# Patient Record
Sex: FEMALE | Race: ASIAN | NOT HISPANIC OR LATINO | Employment: PART TIME | URBAN - METROPOLITAN AREA
[De-identification: names, ages, dates, MRNs, and addresses within clinical notes are randomized per-mention and may not be internally consistent; named-entity substitution may affect disease eponyms.]

---

## 2022-01-07 ENCOUNTER — TRANSFERRED RECORDS (OUTPATIENT)
Dept: HEALTH INFORMATION MANAGEMENT | Facility: CLINIC | Age: 22
End: 2022-01-07

## 2022-03-13 LAB
ABO (EXTERNAL): NORMAL
HEMOGLOBIN (EXTERNAL): 13.4 G/DL (ref 11.7–15.5)
HEPATITIS B SURFACE ANTIGEN (EXTERNAL): NONREACTIVE
HIV1+2 AB SERPL QL IA: NONREACTIVE
PLATELET COUNT (EXTERNAL): 226 10E3/UL (ref 140–400)
RH (EXTERNAL): POSITIVE
RUBELLA ANTIBODY IGG (EXTERNAL): NORMAL
TREPONEMA PALLIDUM ANTIBODY (EXTERNAL): NONREACTIVE

## 2022-09-14 ENCOUNTER — TRANSFERRED RECORDS (OUTPATIENT)
Dept: HEALTH INFORMATION MANAGEMENT | Facility: CLINIC | Age: 22
End: 2022-09-14

## 2022-09-17 LAB — GROUP B STREPTOCOCCUS (EXTERNAL): NEGATIVE

## 2022-10-04 ENCOUNTER — HOSPITAL ENCOUNTER (INPATIENT)
Facility: HOSPITAL | Age: 22
LOS: 2 days | Discharge: HOME | End: 2022-10-06
Attending: ADVANCED PRACTICE MIDWIFE | Admitting: ADVANCED PRACTICE MIDWIFE
Payer: COMMERCIAL

## 2022-10-04 LAB
ABO/RH(D): NORMAL
ANTIBODY SCREEN: NEGATIVE
HGB BLD-MCNC: 12.5 G/DL (ref 11.7–15.7)
SARS-COV-2 RNA RESP QL NAA+PROBE: POSITIVE
SPECIMEN EXPIRATION DATE: NORMAL

## 2022-10-04 PROCEDURE — 86901 BLOOD TYPING SEROLOGIC RH(D): CPT | Performed by: ADVANCED PRACTICE MIDWIFE

## 2022-10-04 PROCEDURE — 250N000011 HC RX IP 250 OP 636: Performed by: ADVANCED PRACTICE MIDWIFE

## 2022-10-04 PROCEDURE — 86850 RBC ANTIBODY SCREEN: CPT | Performed by: ADVANCED PRACTICE MIDWIFE

## 2022-10-04 PROCEDURE — U0005 INFEC AGEN DETEC AMPLI PROBE: HCPCS | Performed by: ADVANCED PRACTICE MIDWIFE

## 2022-10-04 PROCEDURE — 85018 HEMOGLOBIN: CPT | Performed by: ADVANCED PRACTICE MIDWIFE

## 2022-10-04 PROCEDURE — 120N000001 HC R&B MED SURG/OB

## 2022-10-04 PROCEDURE — 36415 COLL VENOUS BLD VENIPUNCTURE: CPT | Performed by: ADVANCED PRACTICE MIDWIFE

## 2022-10-04 RX ORDER — SODIUM CHLORIDE, SODIUM LACTATE, POTASSIUM CHLORIDE, CALCIUM CHLORIDE 600; 310; 30; 20 MG/100ML; MG/100ML; MG/100ML; MG/100ML
INJECTION, SOLUTION INTRAVENOUS CONTINUOUS
Status: DISCONTINUED | OUTPATIENT
Start: 2022-10-04 | End: 2022-10-05 | Stop reason: HOSPADM

## 2022-10-04 RX ORDER — LIDOCAINE 40 MG/G
CREAM TOPICAL
Status: DISCONTINUED | OUTPATIENT
Start: 2022-10-04 | End: 2022-10-05 | Stop reason: HOSPADM

## 2022-10-04 RX ORDER — OXYTOCIN 10 [USP'U]/ML
10 INJECTION, SOLUTION INTRAMUSCULAR; INTRAVENOUS
Status: DISCONTINUED | OUTPATIENT
Start: 2022-10-04 | End: 2022-10-06 | Stop reason: HOSPADM

## 2022-10-04 RX ORDER — KETOROLAC TROMETHAMINE 30 MG/ML
30 INJECTION, SOLUTION INTRAMUSCULAR; INTRAVENOUS
Status: DISCONTINUED | OUTPATIENT
Start: 2022-10-04 | End: 2022-10-06 | Stop reason: HOSPADM

## 2022-10-04 RX ORDER — NALOXONE HYDROCHLORIDE 0.4 MG/ML
0.2 INJECTION, SOLUTION INTRAMUSCULAR; INTRAVENOUS; SUBCUTANEOUS
Status: DISCONTINUED | OUTPATIENT
Start: 2022-10-04 | End: 2022-10-05 | Stop reason: HOSPADM

## 2022-10-04 RX ORDER — CITRIC ACID/SODIUM CITRATE 334-500MG
30 SOLUTION, ORAL ORAL
Status: DISCONTINUED | OUTPATIENT
Start: 2022-10-04 | End: 2022-10-05 | Stop reason: HOSPADM

## 2022-10-04 RX ORDER — CALCIUM CARBONATE 500 MG/1
1 TABLET, CHEWABLE ORAL 2 TIMES DAILY
COMMUNITY

## 2022-10-04 RX ORDER — ONDANSETRON 4 MG/1
4 TABLET, ORALLY DISINTEGRATING ORAL EVERY 6 HOURS PRN
Status: DISCONTINUED | OUTPATIENT
Start: 2022-10-04 | End: 2022-10-05 | Stop reason: HOSPADM

## 2022-10-04 RX ORDER — METOCLOPRAMIDE 10 MG/1
10 TABLET ORAL EVERY 6 HOURS PRN
Status: DISCONTINUED | OUTPATIENT
Start: 2022-10-04 | End: 2022-10-05 | Stop reason: HOSPADM

## 2022-10-04 RX ORDER — FENTANYL CITRATE 50 UG/ML
100 INJECTION, SOLUTION INTRAMUSCULAR; INTRAVENOUS
Status: DISCONTINUED | OUTPATIENT
Start: 2022-10-04 | End: 2022-10-05 | Stop reason: HOSPADM

## 2022-10-04 RX ORDER — ONDANSETRON 2 MG/ML
4 INJECTION INTRAMUSCULAR; INTRAVENOUS EVERY 6 HOURS PRN
Status: DISCONTINUED | OUTPATIENT
Start: 2022-10-04 | End: 2022-10-05 | Stop reason: HOSPADM

## 2022-10-04 RX ORDER — PROCHLORPERAZINE 25 MG
25 SUPPOSITORY, RECTAL RECTAL EVERY 12 HOURS PRN
Status: DISCONTINUED | OUTPATIENT
Start: 2022-10-04 | End: 2022-10-05 | Stop reason: HOSPADM

## 2022-10-04 RX ORDER — OXYTOCIN/0.9 % SODIUM CHLORIDE 30/500 ML
340 PLASTIC BAG, INJECTION (ML) INTRAVENOUS CONTINUOUS PRN
Status: DISCONTINUED | OUTPATIENT
Start: 2022-10-04 | End: 2022-10-05 | Stop reason: HOSPADM

## 2022-10-04 RX ORDER — PROCHLORPERAZINE MALEATE 10 MG
10 TABLET ORAL EVERY 6 HOURS PRN
Status: DISCONTINUED | OUTPATIENT
Start: 2022-10-04 | End: 2022-10-05 | Stop reason: HOSPADM

## 2022-10-04 RX ORDER — IBUPROFEN 800 MG/1
800 TABLET, FILM COATED ORAL
Status: DISCONTINUED | OUTPATIENT
Start: 2022-10-04 | End: 2022-10-06 | Stop reason: HOSPADM

## 2022-10-04 RX ORDER — MISOPROSTOL 200 UG/1
400 TABLET ORAL
Status: DISCONTINUED | OUTPATIENT
Start: 2022-10-04 | End: 2022-10-05 | Stop reason: HOSPADM

## 2022-10-04 RX ORDER — METHYLERGONOVINE MALEATE 0.2 MG/ML
200 INJECTION INTRAVENOUS
Status: DISCONTINUED | OUTPATIENT
Start: 2022-10-04 | End: 2022-10-05 | Stop reason: HOSPADM

## 2022-10-04 RX ORDER — CARBOPROST TROMETHAMINE 250 UG/ML
250 INJECTION, SOLUTION INTRAMUSCULAR
Status: DISCONTINUED | OUTPATIENT
Start: 2022-10-04 | End: 2022-10-05 | Stop reason: HOSPADM

## 2022-10-04 RX ORDER — OXYTOCIN/0.9 % SODIUM CHLORIDE 30/500 ML
100-340 PLASTIC BAG, INJECTION (ML) INTRAVENOUS CONTINUOUS PRN
Status: DISCONTINUED | OUTPATIENT
Start: 2022-10-04 | End: 2022-10-06 | Stop reason: HOSPADM

## 2022-10-04 RX ORDER — MISOPROSTOL 200 UG/1
800 TABLET ORAL
Status: DISCONTINUED | OUTPATIENT
Start: 2022-10-04 | End: 2022-10-05 | Stop reason: HOSPADM

## 2022-10-04 RX ORDER — METOCLOPRAMIDE HYDROCHLORIDE 5 MG/ML
10 INJECTION INTRAMUSCULAR; INTRAVENOUS EVERY 6 HOURS PRN
Status: DISCONTINUED | OUTPATIENT
Start: 2022-10-04 | End: 2022-10-05 | Stop reason: HOSPADM

## 2022-10-04 RX ORDER — ACETAMINOPHEN 500 MG
500-1000 TABLET ORAL EVERY 6 HOURS PRN
COMMUNITY

## 2022-10-04 RX ORDER — OXYTOCIN 10 [USP'U]/ML
10 INJECTION, SOLUTION INTRAMUSCULAR; INTRAVENOUS
Status: DISCONTINUED | OUTPATIENT
Start: 2022-10-04 | End: 2022-10-05 | Stop reason: HOSPADM

## 2022-10-04 RX ORDER — NALOXONE HYDROCHLORIDE 0.4 MG/ML
0.4 INJECTION, SOLUTION INTRAMUSCULAR; INTRAVENOUS; SUBCUTANEOUS
Status: DISCONTINUED | OUTPATIENT
Start: 2022-10-04 | End: 2022-10-05 | Stop reason: HOSPADM

## 2022-10-04 RX ORDER — LIDOCAINE 40 MG/G
CREAM TOPICAL
Status: DISCONTINUED | OUTPATIENT
Start: 2022-10-04 | End: 2022-10-04 | Stop reason: HOSPADM

## 2022-10-04 RX ADMIN — FENTANYL CITRATE 100 MCG: 50 INJECTION, SOLUTION INTRAMUSCULAR; INTRAVENOUS at 23:13

## 2022-10-04 ASSESSMENT — ACTIVITIES OF DAILY LIVING (ADL)
DIFFICULTY_COMMUNICATING: NO
CHANGE_IN_FUNCTIONAL_STATUS_SINCE_ONSET_OF_CURRENT_ILLNESS/INJURY: NO
ADLS_ACUITY_SCORE: 18
WALKING_OR_CLIMBING_STAIRS_DIFFICULTY: NO
ADLS_ACUITY_SCORE: 18
CONCENTRATING,_REMEMBERING_OR_MAKING_DECISIONS_DIFFICULTY: NO
ADLS_ACUITY_SCORE: 18
DOING_ERRANDS_INDEPENDENTLY_DIFFICULTY: NO
HEARING_DIFFICULTY_OR_DEAF: NO
ADLS_ACUITY_SCORE: 18
DIFFICULTY_EATING/SWALLOWING: NO
TOILETING_ISSUES: NO
DRESSING/BATHING_DIFFICULTY: NO
WEAR_GLASSES_OR_BLIND: NO
FALL_HISTORY_WITHIN_LAST_SIX_MONTHS: NO

## 2022-10-04 NOTE — H&P
"HISTORY AND PHYSICAL UPDATE ADMISSION EXAM    Name: Laura Her  YOB: 2000  Medical Record Number: [id number]    History of Present Illness: Laura Her is a 22 year old female who is 38w4d pregnant and being admitted for active labor management and SROM. Prodromal labor since Friday. Contractions intensified today. SROM when getting out of car arriving to hospital.     Last growth ultrasound at 35.5w 44.4%    Estimated Date of Delivery: Oct 14, 2022    EGA 38w4d    OB History    Para Term  AB Living   1 0 0 0 0 0   SAB IAB Ectopic Multiple Live Births   0 0 0 0 0      # Outcome Date GA Lbr Lenny/2nd Weight Sex Delivery Anes PTL Lv   1 Current                 No results found for: ABO, RH, AS, HEPBANG, CHPCRT, GCPCRT, TREPAB, RUBELLAABIGG, HGB, HIV    Prenatal Complications:  1. Varicella non-immune  2. Prepregnancy BMI 32, current BMI 37  3. R simple ovarian cyst 3cm  4. Hx infertility conceived on letrozole and timed intercourse cycle      Exam:      /69   Temp 98.4  F (36.9  C) (Oral)   Ht 1.549 m (5' 1\")   Wt 91.2 kg (201 lb)   Breastfeeding No   BMI 37.98 kg/m      Fetal heart Rate Category 1  Contractions Q2-4    HEENT grossly normal  Neck: appears normal  Lungs unlabored breathing  ABD gravid, non-tender  EXT:  no edema, moves freely  Vaginal exam 5/80/-1 per RN  Membranes: SROM    Assessment: active labor management and SROM    Plan:   Admit - see IP orders  Expectant labor management   Pain medication if requested  Anticipate     Prenatal record reviewed.    Dr. Her aware of patient status and remains available for consultation and collaboration as needed.      ERBEKAH Oretga CNM      10/4/2022   4:17 PM  "

## 2022-10-04 NOTE — PROGRESS NOTES
Data: [facility] presented to [facility]: 10/4/2022  3:07 PM.  Patient admitted for labor, ROM upon arrival in parking lot. Patient is a .  Prenatal record reviewed. Pregnancy has been uncomplicated..  Gestational Age 38w4d. VSS. Fetal movement present. Patient denies vaginal bleeding, pelvic pressure. Support person is present.   Action: Verbal consent for EFM and SVE.  Admission assessment completed. Bill of rights reviewed. SVE performed confirming gross ROM, 5 cm dilation.  Covid swab collection  Response: Patient verbalized agreement with plan. Contacted Chyna Hernandes CNM with update and for orders. CNM came to unit for assessment.  Additionally, Covid test for this patient has now resulted positive.  This RN updated patient to plan of care and visitor policy for asymptomatic Covid diagnosis.    Bettye Jade RN  10/4/2022 5:54 PM

## 2022-10-05 LAB
HOLD SPECIMEN: NORMAL
T PALLIDUM AB SER QL: NONREACTIVE

## 2022-10-05 PROCEDURE — 250N000011 HC RX IP 250 OP 636: Performed by: ADVANCED PRACTICE MIDWIFE

## 2022-10-05 PROCEDURE — 999N000016 HC STATISTIC ATTENDANCE AT DELIVERY

## 2022-10-05 PROCEDURE — 258N000003 HC RX IP 258 OP 636: Performed by: ADVANCED PRACTICE MIDWIFE

## 2022-10-05 PROCEDURE — 250N000009 HC RX 250: Performed by: ADVANCED PRACTICE MIDWIFE

## 2022-10-05 PROCEDURE — 999N000157 HC STATISTIC RCP TIME EA 10 MIN

## 2022-10-05 PROCEDURE — 10907ZC DRAINAGE OF AMNIOTIC FLUID, THERAPEUTIC FROM PRODUCTS OF CONCEPTION, VIA NATURAL OR ARTIFICIAL OPENING: ICD-10-PCS | Performed by: ADVANCED PRACTICE MIDWIFE

## 2022-10-05 PROCEDURE — 722N000001 HC LABOR CARE VAGINAL DELIVERY SINGLE

## 2022-10-05 PROCEDURE — 36415 COLL VENOUS BLD VENIPUNCTURE: CPT | Performed by: ADVANCED PRACTICE MIDWIFE

## 2022-10-05 PROCEDURE — 120N000001 HC R&B MED SURG/OB

## 2022-10-05 PROCEDURE — 86780 TREPONEMA PALLIDUM: CPT | Performed by: ADVANCED PRACTICE MIDWIFE

## 2022-10-05 PROCEDURE — 0KQM0ZZ REPAIR PERINEUM MUSCLE, OPEN APPROACH: ICD-10-PCS | Performed by: ADVANCED PRACTICE MIDWIFE

## 2022-10-05 PROCEDURE — 250N000013 HC RX MED GY IP 250 OP 250 PS 637: Performed by: ADVANCED PRACTICE MIDWIFE

## 2022-10-05 RX ORDER — ACETAMINOPHEN 325 MG/1
650 TABLET ORAL EVERY 4 HOURS PRN
Status: DISCONTINUED | OUTPATIENT
Start: 2022-10-05 | End: 2022-10-06 | Stop reason: HOSPADM

## 2022-10-05 RX ORDER — MISOPROSTOL 200 UG/1
800 TABLET ORAL
Status: DISCONTINUED | OUTPATIENT
Start: 2022-10-05 | End: 2022-10-06 | Stop reason: HOSPADM

## 2022-10-05 RX ORDER — OXYTOCIN 10 [USP'U]/ML
10 INJECTION, SOLUTION INTRAMUSCULAR; INTRAVENOUS
Status: DISCONTINUED | OUTPATIENT
Start: 2022-10-05 | End: 2022-10-06 | Stop reason: HOSPADM

## 2022-10-05 RX ORDER — DOCUSATE SODIUM 100 MG/1
100 CAPSULE, LIQUID FILLED ORAL DAILY
Status: DISCONTINUED | OUTPATIENT
Start: 2022-10-05 | End: 2022-10-06 | Stop reason: HOSPADM

## 2022-10-05 RX ORDER — MODIFIED LANOLIN
OINTMENT (GRAM) TOPICAL
Status: DISCONTINUED | OUTPATIENT
Start: 2022-10-05 | End: 2022-10-06 | Stop reason: HOSPADM

## 2022-10-05 RX ORDER — BISACODYL 10 MG
10 SUPPOSITORY, RECTAL RECTAL DAILY PRN
Status: DISCONTINUED | OUTPATIENT
Start: 2022-10-05 | End: 2022-10-06 | Stop reason: HOSPADM

## 2022-10-05 RX ORDER — OXYTOCIN/0.9 % SODIUM CHLORIDE 30/500 ML
340 PLASTIC BAG, INJECTION (ML) INTRAVENOUS CONTINUOUS PRN
Status: DISCONTINUED | OUTPATIENT
Start: 2022-10-05 | End: 2022-10-06 | Stop reason: HOSPADM

## 2022-10-05 RX ORDER — IBUPROFEN 800 MG/1
800 TABLET, FILM COATED ORAL EVERY 6 HOURS PRN
Status: DISCONTINUED | OUTPATIENT
Start: 2022-10-05 | End: 2022-10-06 | Stop reason: HOSPADM

## 2022-10-05 RX ORDER — MISOPROSTOL 200 UG/1
400 TABLET ORAL
Status: DISCONTINUED | OUTPATIENT
Start: 2022-10-05 | End: 2022-10-06 | Stop reason: HOSPADM

## 2022-10-05 RX ORDER — CARBOPROST TROMETHAMINE 250 UG/ML
250 INJECTION, SOLUTION INTRAMUSCULAR
Status: DISCONTINUED | OUTPATIENT
Start: 2022-10-05 | End: 2022-10-06 | Stop reason: HOSPADM

## 2022-10-05 RX ORDER — HYDROCORTISONE 25 MG/G
CREAM TOPICAL 3 TIMES DAILY PRN
Status: DISCONTINUED | OUTPATIENT
Start: 2022-10-05 | End: 2022-10-06 | Stop reason: HOSPADM

## 2022-10-05 RX ORDER — METHYLERGONOVINE MALEATE 0.2 MG/ML
200 INJECTION INTRAVENOUS
Status: DISCONTINUED | OUTPATIENT
Start: 2022-10-05 | End: 2022-10-06 | Stop reason: HOSPADM

## 2022-10-05 RX ADMIN — SODIUM CHLORIDE, POTASSIUM CHLORIDE, SODIUM LACTATE AND CALCIUM CHLORIDE 500 ML: 600; 310; 30; 20 INJECTION, SOLUTION INTRAVENOUS at 01:18

## 2022-10-05 RX ADMIN — ACETAMINOPHEN 650 MG: 325 TABLET, FILM COATED ORAL at 03:43

## 2022-10-05 RX ADMIN — IBUPROFEN 800 MG: 800 TABLET ORAL at 19:05

## 2022-10-05 RX ADMIN — ACETAMINOPHEN 650 MG: 325 TABLET, FILM COATED ORAL at 08:06

## 2022-10-05 RX ADMIN — LIDOCAINE HYDROCHLORIDE 20 ML: 10 INJECTION, SOLUTION EPIDURAL; INFILTRATION; INTRACAUDAL; PERINEURAL at 01:51

## 2022-10-05 RX ADMIN — DOCUSATE SODIUM 100 MG: 100 CAPSULE, LIQUID FILLED ORAL at 08:06

## 2022-10-05 RX ADMIN — KETOROLAC TROMETHAMINE 30 MG: 30 INJECTION, SOLUTION INTRAMUSCULAR at 03:41

## 2022-10-05 RX ADMIN — FENTANYL CITRATE 100 MCG: 50 INJECTION, SOLUTION INTRAMUSCULAR; INTRAVENOUS at 00:17

## 2022-10-05 ASSESSMENT — ACTIVITIES OF DAILY LIVING (ADL)
ADLS_ACUITY_SCORE: 18

## 2022-10-05 NOTE — PROGRESS NOTES
Hlee is reporting increase in rectal pressure and pain with contractions. Still coping with nonpharmacologic measures. Consented to continuous EFM; monitors in place. SVE per flowsheet. Contacted midwife with update; states she will go to bedside for labor support.

## 2022-10-05 NOTE — PROGRESS NOTES
"Patient Name:  Laura Her  :      2000  MRN:      [id number]    Subjective:  Laura Her is coping well with contractions. Using non pharmacologic  for pain relief. Good PO fluid intake. Voiding without issue. Family supportive at bedside. Rating contractions a 7-8, talking/rocking through them. Cervix now 6cm. Forebag felt, offered AROM, accepts.     COVID swab returned positive on admission. She is asymptomatic. No recent illnesses.     Objective:  /69   Temp 99.7  F (37.6  C) (Oral)   Ht 1.549 m (5' 1\")   Wt 91.2 kg (201 lb)   Breastfeeding No   BMI 37.98 kg/m      Intermittent monitoring  FHR: 140's  Uterine contractions: Q3    SVE: /-1    Assessment:     at 38w4d  Spontaneous labor  Category 1 FHTs  GBS neg  COVID positive  BMI 37    Plan:   -Discussed R/B/A of amniotomy. She consents to this. Completed with return of clear fluid. Questionable meconium fluid on pad, continue to monitor fluid color.   -Routine support & management. Encourage position changes, ambulation, rest as desired.  -Anticipate progress and NSVB.   -Reevaluate progress in 4 hours or sooner with a change in status.     Dr. Her remains available for consultation and collaboration as needed.    Provider:  REBEKAH Ortega CNM    Date:  10/4/2022  Time:  7:09 PM    "

## 2022-10-05 NOTE — L&D DELIVERY NOTE
Vaginal Delivery Note    Name: Laura Her  : 2000  MRN: [id number]    PRE DELIVERY DIAGNOSIS  1) 22 year old  1 Para 0 at 38w5d          POST DELIVERY DIAGNOSIS  1) 22 year old  @ 38w5d  2) Delivery of a viable infant weighing   via     YOB: 2023     Birth Time: 1:42 AM       Augmentation Yes              Indication: ineffective cx pattern- AROM forebag  Induction No                      Indication: N/A    Monitors: External and FSE     GBS: Negative    ROM: SROM  Fluid Type: Meconium    Labor Analgesia/Anesthesia:Non-pharmacologic and Fentanyl    Cord pH obtained: No  Placenta Date/Time: 10/5/2022  1:50 AM   Placenta submitted to Pathology: No    Description of procedure:   22 year old  with PNC w/ [facility] and pregnancy complicated by:  1. Varicella non-immune  2. Prepregnancy BMI 32, current BMI 37  3. R simple ovarian cyst 3cm, resolved  4. Hx infertility conceived on letrozole and timed intercourse cycle     Laura presented to L&D with spontaneous rupture of membranes and labor contractions.  Her hospital course was complicated by fetal heart rate abnormalities.  Patient progressed to complete with AROM of forebag. Slow progress from 6cm to 8.5cm. Feeling strong incontrollable urge to push during this time. Received 2 doses of 100mcg IV fentanyl which allowed her some rest. Eventually incontrollable urge to push came back and she was found to be 9cm. With consent, cervix reduced to complete and 2nd stage begun. Rapid descent initially followed by prolonged ~10 minute fetal deceleration that did not resolve with position changes. During this time, FSE placed, fluid bolus initiated, placed in hands and knees and IHOB and Dr. Her paged for possible vacuum delivery. At 0122, heart tones recovered to 120's and she was allowed to resume pushing in hands and knees. Continued to have intermittent decelerations, moderate variability but was progressing rapidly. Quickly brought baby to  a crown and delivery was imminent. Strong maternal pushing efforts produced  at 0142. Left nuchal hand. Loose nuchal cord that infant delivered through. Large amount of meconium with delivery. Delivery team present for delivery but was not needed. Delayed cord clamping done. FOB cut cord. Dr. Her arrived immediately after delivery.       Shoulder Dystocia No  Operative Vaginal Delivery No  Infant spontaneously delivered over an 2nd degree laceration.   It was repaired in the normal fashion using local anesthesia using 3-0 vicryl.  Infant delivered in ELIANA position.  Nuchal cord Yes    Delivered through    Placenta spontaneously delivered: 10/5/2022  1:50 AM  grossly intact with 3 vessel cord.  Infant: Live, initially stunned infant  was handed to mom.    Delivery was complicated by nothing Interventions included fundal massage.     Delivery QBL (mL): 150    Mother and Infant stable.    REBEKAH Ortega CNM    10/5/2022 2:07 AM

## 2022-10-05 NOTE — PROGRESS NOTES
Discussed with pt about breast pump for home, pt wants time to decide.    Michelle Kingston, RN     Intact

## 2022-10-05 NOTE — PLAN OF CARE
Problem: Adjustment to Role Transition (Postpartum Vaginal Delivery)  Goal: Successful Maternal Role Transition  Outcome: Ongoing, Progressing   Pt feeling well, pain controlled with Tylenol & Ibuprofen prn. Assessments WNL. VSS.   Bonding well with baby, attentive to needs.    Covid + Isolation Precautions.    Michelle Kingston RN

## 2022-10-05 NOTE — PLAN OF CARE
VSS, afebrile. FFU, scant rubra lochia. Pain is well-controlled with toradol and tylenol x1 this shift. Ambulating SBA and voided x1. Pt opted to pump and bottle feed, rather than latch baby to breast. Supplemented with donor milk per request. RN provided breastfeeding/pumping and bottle feeding education; pt verbalized understanding. Parents are attentive to baby and asking appropriate questions.    Problem: Bleeding (Postpartum Vaginal Delivery)  Goal: Hemostasis  Outcome: Ongoing, Progressing     Problem: Pain (Postpartum Vaginal Delivery)  Goal: Acceptable Pain Control  Outcome: Ongoing, Progressing     Problem: Adjustment to Role Transition (Postpartum Vaginal Delivery)  Goal: Successful Maternal Role Transition  Outcome: Ongoing, Progressing     Problem: Infection (Postpartum Vaginal Delivery)  Goal: Absence of Infection Signs/Symptoms  Outcome: Ongoing, Progressing     Problem: Urinary Retention (Postpartum Vaginal Delivery)  Goal: Effective Urinary Elimination  Outcome: Ongoing, Progressing  Intervention: Promote Effective Urinary Elimination  Recent Flowsheet Documentation  Taken 10/5/2022 0340 by Romina Contreras, RN  Urinary Elimination Promotion:   toileting offered   toileting scheduled

## 2022-10-05 NOTE — PROGRESS NOTES
Progress Note    Called into room for decel  On arrival informed of recent prolonged decel for 2-3 minutes but now recovered  FHTs 120s to 130s on my arrival into the room  Dr. Her en route  No assistance requested at this time.   I am available if needed    Gaby Klein MD, FACOG  P) [phone]

## 2022-10-05 NOTE — PROGRESS NOTES
"Patient Name:  Laura Her  :      2000  MRN:      [id number]    Subjective:  Laura Her is coping well with contractions. Using non pharmacologic  for pain relief. Good PO fluid intake. Voiding without issue. Family supportive at bedside. Feeling significant back pain and some rectal pressure. Baby LOT on exam. Frequent position changes to optimize her labor progression.     Objective:  /67 (BP Location: Right arm, Patient Position: Semi-Tong's, Cuff Size: Adult Regular)   Temp 98.4  F (36.9  C) (Oral)   Ht 1.549 m (5' 1\")   Wt 91.2 kg (201 lb)   Breastfeeding No   BMI 37.98 kg/m      FHR:Baseline: 140 bpm, Variability: Moderate (6 - 25 bpm), Accelerations: present and Decelerations: Early Ocassional     Uterine contractions:TocoFrequency: Every 2-3 minutes, Duration: 60 seconds and Intensity: strong    SVE: 8/90/-1, LOT    Assessment:     at 38w4d  Spontaneous labor  Category 1 FHTs  GBS neg  COVID positive  BMI 37    Plan:   -Routine support & management. Encourage position changes, ambulation, rest as desired.  -Anticipate progress and NSVB.   -Reevaluate progress in 3 hours or sooner with a change in status.     Dr. Her remains available for consultation and collaboration as needed.    Provider:  REBEKAH Ortega CNM    Date:  10/4/2022  Time:  10:34 PM    "

## 2022-10-05 NOTE — PLAN OF CARE
VSS, afebrile. Performing IA per policy following reactive NST. Pt endorses 9/10 pain with contractions but states she is coping. Pt aware of available pharmacologic pain management; declines at this time. Will continue to monitor labor progression. Anticipate .    Problem: Change in Fetal Wellbeing (Labor)  Goal: Stable Fetal Wellbeing  Outcome: Ongoing, Progressing     Problem: Delayed Labor Progression (Labor)  Goal: Effective Progression to Delivery  Outcome: Ongoing, Progressing     Problem: Infection (Labor)  Goal: Absence of Infection Signs and Symptoms  Outcome: Ongoing, Progressing     Problem: Labor Pain (Labor)  Goal: Acceptable Pain Control  Outcome: Ongoing, Progressing

## 2022-10-06 VITALS
SYSTOLIC BLOOD PRESSURE: 121 MMHG | OXYGEN SATURATION: 97 % | HEIGHT: 61 IN | RESPIRATION RATE: 16 BRPM | HEART RATE: 77 BPM | TEMPERATURE: 98.5 F | DIASTOLIC BLOOD PRESSURE: 63 MMHG | WEIGHT: 201 LBS | BODY MASS INDEX: 37.95 KG/M2

## 2022-10-06 PROCEDURE — 250N000013 HC RX MED GY IP 250 OP 250 PS 637: Performed by: ADVANCED PRACTICE MIDWIFE

## 2022-10-06 RX ORDER — DOCUSATE SODIUM 100 MG/1
100 CAPSULE, LIQUID FILLED ORAL DAILY
Qty: 30 CAPSULE | Refills: 1 | Status: SHIPPED | OUTPATIENT
Start: 2022-10-06

## 2022-10-06 RX ORDER — IBUPROFEN 800 MG/1
800 TABLET, FILM COATED ORAL EVERY 8 HOURS PRN
Qty: 30 TABLET | Refills: 1 | Status: SHIPPED | OUTPATIENT
Start: 2022-10-06

## 2022-10-06 RX ADMIN — DOCUSATE SODIUM 100 MG: 100 CAPSULE, LIQUID FILLED ORAL at 10:30

## 2022-10-06 RX ADMIN — BENZOCAINE AND LEVOMENTHOL: 200; 5 SPRAY TOPICAL at 13:13

## 2022-10-06 RX ADMIN — IBUPROFEN 800 MG: 800 TABLET ORAL at 10:29

## 2022-10-06 RX ADMIN — WITCH HAZEL: 500 SOLUTION RECTAL; TOPICAL at 13:13

## 2022-10-06 ASSESSMENT — ACTIVITIES OF DAILY LIVING (ADL)
ADLS_ACUITY_SCORE: 18

## 2022-10-06 NOTE — PROGRESS NOTES
"Vaginal Delivery Postpartum Day 1    Patient Name:  Laura Her  :      2000  MRN:      [id number]      Assessment:  Normal postpartum course. Covid positive in isolation.    Plan:  Continue current care. Discharge home today.      Subjective:  The patient feels well:  Voiding without difficulty, lochia normal, tolerating normal diet, ambulating without difficulty and passing flatus. Voiding independently without complication. Pain is well controlled with current medications.  The patient has no emotional concerns.  The baby is well and being fed by both breast and bottle.      YOB: 2023   Birth Time: 1:42 AM     Prenatal Complications include:   1. Varicella non-immune  2. Prepregnancy BMI 32, current BMI 37  3. R simple ovarian cyst 3cm  4. Hx infertility conceived on letrozole and timed intercourse cycle  5. Covid positive on admission to L/D    Objective:  /63 (BP Location: Right arm, Patient Position: Semi-Tong's, Cuff Size: Adult Regular)   Pulse 77   Temp 98.5  F (36.9  C) (Oral)   Resp 16   Ht 1.549 m (5' 1\")   Wt 91.2 kg (201 lb)   SpO2 97%   Breastfeeding Unknown   BMI 37.98 kg/m    Patient Vitals for the past 24 hrs:   BP Temp Temp src Pulse Resp SpO2   10/06/22 0200 121/63 -- -- 77 -- --   10/06/22 0010 -- 98.5  F (36.9  C) Oral -- 16 --   10/05/22 1605 103/56 98.3  F (36.8  C) Oral 73 16 97 %   10/05/22 1207 107/70 98.1  F (36.7  C) Oral 70 -- 97 %       Exam: Patient A&O x 3. No acute distress, breathing unlabored.The amount and color of the lochia is appropriate for the duration of recovery. Uterine fundus is firm at U-1. Perineum: no significant edema and sutures intact      Lab Results   Component Value Date    AS Negative 10/04/2022    HGB 12.5 10/04/2022       Immunization History   Administered Date(s) Administered     COVID-19,PF,Moderna 2021, 2021     Influenza Quad, Recombinant, pf(RIV4) (Flublok) 2022       Provider:  Bridgette Mendes, APRN " RICKM    Date:  10/6/2022  Time:  9:48 AM

## 2022-10-06 NOTE — PLAN OF CARE
Patient meets discharge goals, has been seen by provider and orders received. Order also received for breast pump home use and set up and reviewed with patient. Discharge instructions reviewed with patient and pt discharged home with partner and baby ambulatory

## 2022-10-06 NOTE — PLAN OF CARE
Problem: Adjustment to Role Transition (Postpartum Vaginal Delivery)  Goal: Successful Maternal Role Transition  Outcome: Ongoing, Progressing  Intervention: Support Maternal Role Transition  Recent Flowsheet Documentation  Taken 10/6/2022 0010 by Vivian Galdamez RN  Parent/Child Attachment Promotion: caring behavior modeled     Problem: Pain (Postpartum Vaginal Delivery)  Goal: Acceptable Pain Control  Outcome: Ongoing, Progressing  Intervention: Prevent or Manage Pain  Recent Flowsheet Documentation  Taken 10/6/2022 0010 by Vivian Galdamez RN  Pain Management Interventions: declines  Patient has managed her pain without the use of pain medications this shift.

## 2022-10-06 NOTE — PLAN OF CARE
Problem: Urinary Retention (Postpartum Vaginal Delivery)  Goal: Effective Urinary Elimination  10/5/2022 2143 by Magdalena Reyes, RN  Outcome: Met  10/5/2022 2017 by Magdalena Reyes, RN  Outcome: Ongoing, Progressing   Voiding without difficulty- declines pain per nursing staff in the room with patient.

## 2022-10-06 NOTE — PROGRESS NOTES
Outreach Nurse Note    Laura Her  [id number]  2000    Chart reviewed, discharge follow-up plan discussed with patient's bedside RN, needs assessed. Post-delivery follow-up appointment planned in 6 weeks at [facility]. Patient, Laura, is reported to have support at home and would like to discharge today with .     Outreach nurse will continue to follow and assist with discharge planning as needed. No additional discharge needs reported at this time.

## 2022-10-06 NOTE — DISCHARGE SUMMARY
OB Discharge Summary      Date:  10/6/2022    Name:  Laura Her  :  2000  MRN:  [id number]      Admission Date:  10/4/2022  Delivery Date: 10/5/2022   Gestational Age at Delivery:  38w5d  Discharge Date:  10/6/2022    Principal Diagnosis:    There is no problem list on file for this patient.        Conditions complicating Pregnancy:   1. Varicella non-immune  2. Prepregnancy BMI 32, current BMI 37  3. R simple ovarian cyst 3cm  4. Hx infertility conceived on letrozole and timed intercourse cycle  5. Covid positive upon admission to L/D    Procedure(s) Performed:  AROM, FSE, 2nd degree lac    Indication for :  none  Indication for Induction:  none     Condition at Discharge:  stable    Discharge Medications:      Review of your medicines      START taking      Dose / Directions   docusate sodium 100 MG capsule  Commonly known as: COLACE  Used for:  (normal spontaneous vaginal delivery)      Dose: 100 mg  Take 1 capsule (100 mg) by mouth daily  Quantity: 30 capsule  Refills: 1     ibuprofen 800 MG tablet  Commonly known as: ADVIL/MOTRIN  Used for:  (normal spontaneous vaginal delivery)      Dose: 800 mg  Take 1 tablet (800 mg) by mouth every 8 hours as needed for other (cramping)  Quantity: 30 tablet  Refills: 1        CONTINUE these medicines which have NOT CHANGED      Dose / Directions   acetaminophen 500 MG tablet  Commonly known as: TYLENOL  Indication: Pain      Dose: 500-1,000 mg  Take 500-1,000 mg by mouth every 6 hours as needed for mild pain  Refills: 0     calcium carbonate 500 MG chewable tablet  Commonly known as: TUMS  Indication: Heartburn      Dose: 1 chew tab  Take 1 chew tab by mouth 2 times daily  Refills: 0           Where to get your medicines      Some of these will need a paper prescription and others can be bought over the counter. Ask your nurse if you have questions.    Bring a paper prescription for each of these medications    docusate sodium 100 MG  capsule    ibuprofen 800 MG tablet          Discharge Plan:    Follow up with /CNM:  At [facility] [place] [facility] in 6 weeks for routine PP visit   Patient Instructions:      Physical activity: as tolerated    Diet:  As tolerated    Medication: see above          Physician/CNM: REBEKAH Amin CNM, Dr. remains available for consultation and collaboration as needed.      Name:  Laura Her  :  2000  MRN:  [id number]